# Patient Record
Sex: MALE | Race: WHITE
[De-identification: names, ages, dates, MRNs, and addresses within clinical notes are randomized per-mention and may not be internally consistent; named-entity substitution may affect disease eponyms.]

---

## 2019-09-14 ENCOUNTER — HOSPITAL ENCOUNTER (EMERGENCY)
Dept: HOSPITAL 25 - ED | Age: 39
Discharge: HOME | End: 2019-09-14
Payer: COMMERCIAL

## 2019-09-14 VITALS — DIASTOLIC BLOOD PRESSURE: 67 MMHG | SYSTOLIC BLOOD PRESSURE: 114 MMHG

## 2019-09-14 DIAGNOSIS — R60.9: ICD-10-CM

## 2019-09-14 DIAGNOSIS — M79.671: Primary | ICD-10-CM

## 2019-09-14 PROCEDURE — 99282 EMERGENCY DEPT VISIT SF MDM: CPT

## 2019-09-14 NOTE — XMS REPORT
Continuity of Care Document (CCD)

 Created on:2019



Patient:Cordell Dobbs

Sex:Male

:1980

External Reference #:[id number]





Demographics







 Address  [address] [place] [address]

 

 Home Phone  [phone]

 

 Mobile Phone  [phone]

 

 Email Address  [email or web address]

 

 Preferred Language  en

 

 Marital Status  Declined to Specify/Unknown

 

 Baptism Affiliation  Unknown

 

 Race  Unknown

 

 Ethnic Group  Declined to Specify/Unknown









Author







 Name  Matias Quinn MD (transmitted by agent of provider Julián Bolden)

 

 Address  [address] [place] [address]









Care Team Providers







 Name  Role  Phone

 

 Saroj Stearns DO - Family Medicine  Care Team Information   [phone]
[phone]









Problems







 Active Problems  Provider  Date

 

 Closed fracture of metatarsal bone  Matias Quinn MD  Onset: 2019

 

 Open fracture of talus  Matias Quinn MD  Onset: 2019

 

 Closed traumatic dislocation ankle joint  Matias Quinn MD  Onset: 2019







Social History







 Type  Date  Description  Comments

 

 Birth Sex    Unknown  

 

 Tobacco Use  Start: Unknown End: Unknown  Patient is a former smoker  

 

 Smoking Status  Reviewed: 19  Patient is a former smoker  







Allergies, Adverse Reactions, Alerts







 Description

 

 No Known Drug Allergies







Medications







 Active Medications  SIG  Qnty  Indications  Ordering Provider  Date

 

 Methocarbamol        Clara Valenzuela MD  



 750mg Tablets          

 

 Tramadol HCL        Clara Valenzuela MD  



 50mg Tablets          

 

 SM Childrens Aspirin        Unknown  



      81mg Chewtabs          

 

 Enoxaparin Sodium        Unknown  



   40mg/0.4ML Solution          



           

 

 SM Pain Reliever        Unknown  



  325mg Tablets          







Immunizations







 Description

 

 No Information Available







Vital Signs







 Date  Vital  Result  Comment

 

 2019  1:21pm  Height  70 inches  5'10"









 Weight  216.00 lb  

 

 Heart Rate  100 /min  

 

 BP Systolic  116 mmHg  

 

 BP Diastolic  66 mmHg  

 

 BMI (Body Mass Index)  31.0 kg/m2  









 2019  8:43am  Height  70 inches  5'10"









 Weight  216.00 lb  

 

 BP Systolic  118 mmHg  

 

 BP Diastolic  72 mmHg  

 

 Respiratory Rate  16 /min  

 

 Pain Level  7  

 

 BMI (Body Mass Index)  31.0 kg/m2  







Results







 Description

 

 No Information Available







Procedures







 Description

 

 No Information Available







Medical Devices







 Description

 

 No Information Available







Encounters







 Type  Date  Location  Provider  Dx  Diagnosis

 

 Office Visit  2019  Orthopedic  Matias Quinn,  S93.04xA  Dislocation of



   1:15p  [facility]    right ankle joint,



           initial encounter









 S92.101B  Unsp fracture of right talus, init encntr for open fracture

 

 S92.331A  Disp fx of third metatarsal bone, right foot, init

 

 S92.341A  Disp fx of fourth metatarsal bone, right foot, init

 

 S93.04xA  Dislocation of right ankle joint, initial encounter









 Office Visit  2019  Orthopedic  Matias Quinn  S93.04xA  Dislocation of



   8:30a  [facility]  MD    right ankle



     C.M.A.      joint, initial



           encounter









 S92.101B  Unsp fracture of right talus, init encntr for open fracture

 

 S92.331A  Disp fx of third metatarsal bone, right foot, init

 

 S92.341A  Disp fx of fourth metatarsal bone, right foot, init







Assessments







 Date  Code  Description  Provider

 

 2019  S93.04xA  Dislocation of right ankle joint, initial  Matias Quinn MD



     encounter  

 

 2019  S92.101B  Unspecified fracture of right talus, initial  Matias Quinn MD



     encounter for open fracture  

 

 2019  S92.331A  Displaced fracture of third metatarsal bone,  Matias Quinn MD



     right foot, initial encounter for closed  



     fracture  

 

 2019  S92.341A  Displaced fracture of fourth metatarsal bone,  Matias Quinn MD



     right foot, initial encounter for closed  



     fracture  

 

 2019  S93.04xA  Dislocation of right ankle joint, initial  Matias Quinn MD



     encounter  

 

 2019  S93.04xA  Dislocation of right ankle joint, initial  Matias Quinn MD



     encounter  

 

 2019  S92.101B  Unspecified fracture of right talus, initial  Matias Quinn MD



     encounter for open fracture  

 

 2019  S92.331A  Displaced fracture of third metatarsal bone,  Matias Quinn MD



     right foot, initial encounter for closed  



     fracture  

 

 2019  S92.341A  Displaced fracture of fourth metatarsal bone,  Matias Quinn MD



     right foot, initial encounter for closed  



     fracture  







Plan of Treatment

Future Appointment(s):10/07/2019  1:00 pm - Matias Quinn MD at [facility].2019 - Matias Quinn, MDS93.04xA Dislocation of right 
ankle joint, initial encounterNew Therapy:Physical TherapyReferral: 
Prosthetics &amp; Orthotics,Follow up:Follow Up:   3 weeks with new ankle and 
foot gubhuG78.101B Unspecified fracture of right talus, initial encounter for 
open fntrwvzcK24.331A Displaced fracture of third metatarsal bone, right foot, 
initial encounter for closed kaixcxwsZ07.341A Displaced fracture of fourth 
metatarsal bone, right foot, initial encounter for closed fracture



Functional Status







 Description

 

 No Information Available







Mental Status







 Description

 

 No Information Available







Referrals







 Refer to   Reason for Referral  Status  Appt Date

 

  Prosthetics & Orthotics  Orthotics:  Right Left Bilateral  Created  



   Full-length custom Accommodative    



   Medial Post  3/4 length custom    



   Semi-Rigid Deep Heel  UCBL Rigid    



   Hedrick's extension  Carbon fiber    



   baseplate to shield 1st MP joint    



   Carbon fiber baseplate, full length    



   to shield midfoot  Other:  Braces:    



   Right Left Bilateral   Solid AFO    



   Articulated AFO  [place] [employer]ace    



   (custom leather ankle gauntlet)    



   JOSEPH (Charcot Restraint Orthotic    



   Walker)  Abdias Brace  Other:    









 [address] [facility]

 

 [address]

 

 [place] [address] [phone]
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
## 2019-09-14 NOTE — XMS REPORT
Continuity of Care Document (CCD)

 Created on:2019



Patient:Cordell Dobbs

Sex:Male

:1980

External Reference #:[id number]





Demographics







 Address  [address] [place] [address]

 

 Home Phone  [phone]

 

 Mobile Phone  [phone]

 

 Email Address  [email or web address]

 

 Preferred Language  en

 

 Marital Status  Declined to Specify/Unknown

 

 Taoist Affiliation  Unknown

 

 Race  Unknown

 

 Ethnic Group  Declined to Specify/Unknown









Author







 Name  Matias Quinn MD (transmitted by agent of provider Keren Barney)

 

 Address  [address] [place] [address]









Care Team Providers







 Name  Role  Phone

 

 Saroj Stearns DO - Family Medicine  Care Team Information   [phone]
[phone]









Problems







 Active Problems  Provider  Date

 

 Closed traumatic dislocation ankle joint  Matias Quinn MD  Onset: 2019







Social History







 Type  Date  Description  Comments

 

 Birth Sex    Unknown  

 

 Tobacco Use  Start: Unknown End: Unknown  Patient is a former smoker  

 

 Smoking Status  Reviewed: 19  Patient is a former smoker  







Allergies, Adverse Reactions, Alerts







 Description

 

 No Known Drug Allergies







Medications







 Active Medications  SIG  Qnty  Indications  Ordering Provider  Date

 

 Methocarbamol        Clara Valenzuela MD  



 750mg Tablets          

 

 Tramadol HCL        Clara Valenzuela MD  



 50mg Tablets          

 

 SM Childrens Aspirin        Unknown  



      81mg Chewtabs          

 

 Enoxaparin Sodium        Unknown  



   40mg/0.4ML Solution          



           

 

 SM Pain Reliever        Unknown  



  325mg Tablets          







Immunizations







 Description

 

 No Information Available







Vital Signs







 Date  Vital  Result  Comment

 

 2019  8:43am  Height  70 inches  5'10"









 Weight  216.00 lb  

 

 BP Systolic  118 mmHg  

 

 BP Diastolic  72 mmHg  

 

 Respiratory Rate  16 /min  

 

 Pain Level  7  

 

 BMI (Body Mass Index)  31.0 kg/m2  







Results







 Description

 

 No Information Available







Procedures







 Description

 

 No Information Available







Medical Devices







 Description

 

 No Information Available







Encounters







 Description

 

 No Information Available







Assessments







 Date  Code  Description  Provider

 

 2019  S93.04xA  Dislocation of right ankle joint, initial  Matias Quinn MD



     encounter  







Plan of Treatment

Future Appointment(s):2019  9:30 am - Matias Quinn MD at [facility]2019 - Matias Quinn, MDS93.04xA Dislocation of right 
ankle joint, initial encounterFollow up:Follow Up:   2 weeks



Functional Status







 Description

 

 No Information Available







Mental Status







 Description

 

 No Information Available







Referrals







 Description

 

 No Information Available